# Patient Record
Sex: MALE | Race: WHITE | NOT HISPANIC OR LATINO | Employment: FULL TIME | URBAN - METROPOLITAN AREA
[De-identification: names, ages, dates, MRNs, and addresses within clinical notes are randomized per-mention and may not be internally consistent; named-entity substitution may affect disease eponyms.]

---

## 2024-11-08 ENCOUNTER — OFFICE VISIT (OUTPATIENT)
Dept: CARDIOLOGY | Facility: CLINIC | Age: 54
End: 2024-11-08
Payer: COMMERCIAL

## 2024-11-08 VITALS
SYSTOLIC BLOOD PRESSURE: 140 MMHG | BODY MASS INDEX: 22.65 KG/M2 | HEIGHT: 70 IN | WEIGHT: 158.2 LBS | HEART RATE: 72 BPM | OXYGEN SATURATION: 98 % | DIASTOLIC BLOOD PRESSURE: 88 MMHG

## 2024-11-08 DIAGNOSIS — Z82.49 FAMILY HISTORY OF CORONARY ARTERIOSCLEROSIS: Primary | ICD-10-CM

## 2024-11-08 DIAGNOSIS — E78.2 MIXED HYPERLIPIDEMIA: ICD-10-CM

## 2024-11-08 PROCEDURE — 93000 ELECTROCARDIOGRAM COMPLETE: CPT | Performed by: INTERNAL MEDICINE

## 2024-11-08 PROCEDURE — 99204 OFFICE O/P NEW MOD 45 MIN: CPT | Performed by: INTERNAL MEDICINE

## 2024-11-08 NOTE — PROGRESS NOTES
"      CARDIOLOGY    Michelle Paige MD    ENCOUNTER DATE:  11/08/2024    Jimmy Hernandez / 54 y.o. / male        CHIEF COMPLAINT / REASON FOR OFFICE VISIT     Hyperlipidemia      HISTORY OF PRESENT ILLNESS       HPI    Jimmy Hernandez is a 54 y.o. male     This patient's father is a patient of mine.  He has a family history of hyperlipidemia and family history of premature coronary artery disease.  His father had severe LAD lesion at the age of 58.  His paternal grandfather had CABG at age 60.  This gentleman is a vegetarian.  He is walking.  He says he cannot run anymore because of bursitis.  He does yard work around the house and does not have any exertional symptoms.  He had lipids drawn in 05/2024 and his LDL was 190 with HDL of 44 and triglycerides 69.  He cut out ice cream and egg yolks, and a repeat lipid panel in 09/2024 showed his LDL cholesterol had gone down to 159.  HDL was 46 and triglycerides were 82.  He also had a lipoprotein A drawn in 09/2024, which was elevated at 145 (normal less than 29).         VITAL SIGNS     Visit Vitals  /88   Pulse 72   Ht 177.8 cm (70\")   Wt 71.8 kg (158 lb 3.2 oz)   SpO2 98%   BMI 22.70 kg/m²         Wt Readings from Last 3 Encounters:   11/08/24 71.8 kg (158 lb 3.2 oz)     Body mass index is 22.7 kg/m².      PHYSICAL EXAMINATION     Constitutional:       General: Not in acute distress.  Neck:      Vascular: No carotid bruit or JVD.   Pulmonary:      Effort: Pulmonary effort is normal.      Breath sounds: Normal breath sounds.   Cardiovascular:      Normal rate. Regular rhythm.      Murmurs: There is no murmur.   Psychiatric:         Mood and Affect: Mood and affect normal.           REVIEWED DATA       ECG 12 Lead    Date/Time: 11/8/2024 1:42 PM  Performed by: Michelle Paige MD    Authorized by: Michelle Paige MD  Comparison: not compared with previous ECG   Previous ECG: no previous ECG available  Rhythm: sinus rhythm  BPM: 72  Conduction: conduction " normal  ST Segments: ST segments normal  T Waves: T waves normal    Clinical impression: normal ECG          I reviewed laboratory data from the [facility] system through [employer].    ASSESSMENT & PLAN      Diagnosis Plan   1. Family history of coronary arteriosclerosis  CT cardiac calcium score wo dye      2. Mixed hyperlipidemia  CT cardiac calcium score wo dye          Hyperlipidemia.  He was able to bring his LDL down from 190 to 156 with cutting out ice cream and egg yolks, but he does not have anywhere else to go as he is a vegetarian and already eats healthy.  I did encourage more routine exercise.  Family history of premature coronary disease.   He is leading a very healthy lifestyle.  His blood pressure was slightly elevated today, but he said that he had to provide first aid to someone who fell and hit their head in the parking lot and was bleeding.  he did recently buy a blood pressure cuff and I have asked him to start monitoring his blood pressure at home and let me know if he is getting over 130/85 on a routine basis.   I recommend that we check a calcium score to help risk stratify him and see where he falls.  I think he is going to end up needing statin medications to decrease his risk of coronary disease going forward, and he is fine with that.  If his calcium score is markedly higher than what I would expect, we may need to do some more aggressive control of his cholesterol going forward.     I will review the results of the calcium score when I have it back, and one of my nurses or I will call him and let him know what the plan is as far as medication and need for repeat blood workup and followup appointment.            Orders Placed This Encounter   Procedures    CT cardiac calcium score wo dye     Standing Status:   Future     Standing Expiration Date:   11/8/2025     Order Specific Question:   Reason for Exam:     Answer:   family hx     Order Specific Question:   Release to patient     Answer:    Routine Release [[id number]]    ECG 12 Lead     This order was created via procedure documentation     Order Specific Question:   Release to patient     Answer:   Routine Release [[id number]]           MEDICATIONS         Discharge Medications      as of November 8, 2024  1:42 PM     Patient Not Prescribed Medications Upon Discharge           Michelle Paige MD  11/08/24  13:42 EST    Part of this note may be an electronic transcription/translation of spoken language to printed text using the Dragon dictation system.

## 2024-11-14 ENCOUNTER — TELEPHONE (OUTPATIENT)
Dept: CARDIOLOGY | Facility: CLINIC | Age: 54
End: 2024-11-14

## 2024-11-14 NOTE — TELEPHONE ENCOUNTER
[facility] sent over CT calcium Score report  Total calcium score is 0    Will place this in your mailbox.

## 2024-11-18 NOTE — TELEPHONE ENCOUNTER
Left voicemail for Jimmy Hernandez requesting callback.    HUB: please transfer to Triage if patient returns call    Thank you,  Salena SINGH RN  Triage Nurse [facility]  11/18/24   11:55 EST

## 2024-11-18 NOTE — TELEPHONE ENCOUNTER
I recommend that he follow carefully with his primary provider and come back to see me if he has any change in his symptoms such as chest pain, dyspnea on exertion or palpitations.

## 2024-11-18 NOTE — TELEPHONE ENCOUNTER
Left VM of recommendations and to call back with any further questions or concerns, allowed by verbal release form.     Britney Esqueda RN  Triage [facility]

## 2024-11-18 NOTE — TELEPHONE ENCOUNTER
Notified patient of results/recommendations. Patient verbalized understanding. He is wondering when he needs to come back for FU?    Britney Esqueda RN  Triage [facility] [place]

## 2024-11-18 NOTE — TELEPHONE ENCOUNTER
Please let him know that I received the results of his calcium score and it was 0 which is excellent!  I do not feel strongly that he needs to start statin medication.  I recommend that he continue with the dietary modifications he already made which helped to lower his LDL cholesterol.  I also encourage more regular exercise.